# Patient Record
(demographics unavailable — no encounter records)

---

## 2025-07-08 NOTE — REASON FOR VISIT
[FreeTextEntry2] : New injury - LT hand pain. Patient states that he has been having thumb pain on and off for about out a year. Feels sudden sharp pains with movements.

## 2025-07-08 NOTE — DISCUSSION/SUMMARY
[de-identified] : Discussed the nature of the diagnosis and risk and benefits of different modalities of treatment. LT CMC arthritis  X rays reviewed & discussed Discussed conservative management as symptoms demand vs CSI. Pt would like a CSI. Done today and tolerated well. Should CSI fail to provide relief, pt will take OTC NSAID's and apply warm compresses.  He will splint  Rx provided All questions answered.

## 2025-07-08 NOTE — DISCUSSION/SUMMARY
[de-identified] : Discussed the nature of the diagnosis and risk and benefits of different modalities of treatment. LT CMC arthritis  X rays reviewed & discussed Discussed conservative management as symptoms demand vs CSI. Pt would like a CSI. Done today and tolerated well. Should CSI fail to provide relief, pt will take OTC NSAID's and apply warm compresses.  He will splint  Rx provided All questions answered.

## 2025-07-08 NOTE — HISTORY OF PRESENT ILLNESS
[5] : 5 [0] : 0 [Dull/Aching] : dull/aching [Localized] : localized [Sharp] : sharp [Stabbing] : stabbing [Frequent] : frequent [Retired] : Work status: retired [de-identified] : 72 year old male with approximately one year of pain at the base of his LEFT thumb.  Denies trauma.  Pain is intermittent.  Worse with opening doors and twisting with grasp.    Has taken advil, with slight improvement.    Has sought no care for this. [] : no

## 2025-07-08 NOTE — PHYSICAL EXAM
[Left] : left wrist [] : no laceration/abrasion [FreeTextEntry3] : LEFT 1st CMC swelling LEFT 1st CMC tenderness. 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

## 2025-07-08 NOTE — HISTORY OF PRESENT ILLNESS
[5] : 5 [0] : 0 [Dull/Aching] : dull/aching [Localized] : localized [Sharp] : sharp [Stabbing] : stabbing [Frequent] : frequent [Retired] : Work status: retired [de-identified] : 72 year old male with approximately one year of pain at the base of his LEFT thumb.  Denies trauma.  Pain is intermittent.  Worse with opening doors and twisting with grasp.    Has taken advil, with slight improvement.    Has sought no care for this. [] : no

## 2025-07-08 NOTE — PROCEDURE
[FreeTextEntry3] : Medium joint injection was performed of the left CMC joint. The indication for this procedure was pain and inflammation. The site was prepped with alcohol, ethyl chloride sprayed topically, and sterile technique used. An injection of Lidocaine 0.5cc of 1%, Triamcinolone (Kenalog) 0.5cc of 10 mg was used. Patient tolerated procedure well.   The risks benefits, and alternatives have been discussed, and verbal consent was obtained.   Thumb CMC injection was performed because of pain inflammation and stiffness. Patient has tried OTC's including aspirin, Ibuprofen, Aleve etc or prescription NSAIDS, and/or exercises at home and/ or physical therapy without satisfactory response. After verbal consent using sterile preparation and technique. The risks, benefits, and alternatives to cortisone injection were explained in full to the patient. Risks outlined include but are not limited to infection, sepsis, bleeding, scarring, skin, discoloration, temporary increase in pain, syncopal episode, failure to resolve symptoms, allergic reaction, symptom recurrence, and elevation of blood sugar in diabetics. Patient understood the risks. All questions were answered. After discussion of options, patient requested an injection. Oral informed consent was obtained, and sterile prep was done of the injection site. Sterile technique was utilized for the procedure including the preparation of the solutions used for the injection. Patient tolerated the procedure well. Advised to ice the injection site this evening. Sterile technique used.

## 2025-07-21 NOTE — PHYSICAL EXAM
[Right] : right knee [5___] : hamstring 5[unfilled]/5 [Negative] : negative Tico's [] : not mildly antalgic [FreeTextEntry3] : Skin mass approx 1cm medial aspect of knee, nontender [TWNoteComboBox7] : flexion 120 degrees [de-identified] : extension 0 degrees

## 2025-07-21 NOTE — HISTORY OF PRESENT ILLNESS
[Result of repetitive motion] : result of repetitive motion [4] : 4 [1] : 2 [Dull/Aching] : dull/aching [Occasional] : occasional [Leisure] : leisure [Social interactions] : social interactions [Retired] : Work status: retired [de-identified] : Follow up  Rt Knee pain had CSI last Visit    [] : Post Surgical Visit: no

## 2025-07-21 NOTE — DISCUSSION/SUMMARY
[de-identified] : General Dx discussion The patient was advised of the diagnosis. The natural history of the pathology was explained in full to the patient in layman's terms. All questions were answered. The risks and benefits of surgical and non-surgical treatment alternatives were explained in full to the patient.   Case discussed. doing well f/u 2 months poss of viscosupp discussed